# Patient Record
Sex: FEMALE | URBAN - METROPOLITAN AREA
[De-identification: names, ages, dates, MRNs, and addresses within clinical notes are randomized per-mention and may not be internally consistent; named-entity substitution may affect disease eponyms.]

---

## 2018-03-14 ENCOUNTER — APPOINTMENT (OUTPATIENT)
Age: 28
Setting detail: DERMATOLOGY
End: 2018-03-14

## 2018-03-14 DIAGNOSIS — L98429 CHRONIC ULCER OF OTHER SPECIFIED SITES: ICD-10-CM

## 2018-03-14 DIAGNOSIS — L92.1 NECROBIOSIS LIPOIDICA, NOT ELSEWHERE CLASSIFIED: ICD-10-CM

## 2018-03-14 DIAGNOSIS — L98419 CHRONIC ULCER OF OTHER SPECIFIED SITES: ICD-10-CM

## 2018-03-14 PROBLEM — L97.829 NON-PRESSURE CHRONIC ULCER OF OTHER PART OF LEFT LOWER LEG WITH UNSPECIFIED SEVERITY: Status: ACTIVE | Noted: 2018-03-14

## 2018-03-14 PROCEDURE — OTHER TREATMENT REGIMEN: OTHER

## 2018-03-14 PROCEDURE — OTHER PRESCRIPTION: OTHER

## 2018-03-14 PROCEDURE — OTHER ADDITIONAL NOTES: OTHER

## 2018-03-14 PROCEDURE — OTHER COUNSELING: OTHER

## 2018-03-14 PROCEDURE — 99214 OFFICE O/P EST MOD 30 MIN: CPT

## 2018-03-14 RX ORDER — BECAPLERMIN 100 UG/G
GEL TOPICAL
Qty: 2 | Refills: 2 | Status: ERX | COMMUNITY
Start: 2018-03-14

## 2018-03-14 ASSESSMENT — LOCATION SIMPLE DESCRIPTION DERM: LOCATION SIMPLE: LEFT PRETIBIAL REGION

## 2018-03-14 ASSESSMENT — LOCATION DETAILED DESCRIPTION DERM: LOCATION DETAILED: LEFT DISTAL PRETIBIAL REGION

## 2018-03-14 ASSESSMENT — LOCATION ZONE DERM: LOCATION ZONE: LEG

## 2018-03-14 ASSESSMENT — AREA OF WOUND IN CM2: TOTAL AREA OF WOUND IN CM2: 0

## 2018-03-14 NOTE — PROCEDURE: TREATMENT REGIMEN
Detail Level: Zone
Initiate Treatment: PACE technique- 10% benzoyl peroxide soaked gauze to inner wound, surround outside with zinc oxide, cover with Saran Wrap, then cover entire wound with tube gauze. Change once daily

## 2018-03-14 NOTE — PROCEDURE: COUNSELING
Detail Level: Detailed
Patient Specific Counseling (Will Not Stick From Patient To Patient): Over 30 minutes spent counseling on modifiable risk factors including smoking and uncontrolled diabetes. Discussed that this will likely not heal if these are not controlled. She verbalized understanding. Discussed following up with primary care to discuss smoking cessation options
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
## 2018-03-23 ENCOUNTER — RX ONLY (RX ONLY)
Age: 28
End: 2018-03-23

## 2018-03-23 RX ORDER — BENZOYL PEROXIDE 100 MG/ML
LIQUID TOPICAL
Qty: 1 | Refills: 2 | Status: ERX | COMMUNITY
Start: 2018-03-23

## 2018-03-23 RX ORDER — MAG HYDROX/ALUMINUM HYD/SIMETH 400-400-40
SUSPENSION, ORAL (FINAL DOSE FORM) ORAL
Qty: 210 | Refills: 2 | Status: ERX | COMMUNITY
Start: 2018-03-23

## 2018-04-11 ENCOUNTER — APPOINTMENT (OUTPATIENT)
Age: 28
Setting detail: DERMATOLOGY
End: 2018-04-12

## 2018-04-11 DIAGNOSIS — L97 NON-PRESSURE CHRONIC ULCER OF LOWER LIMB, NOT ELSEWHERE CLASSIFIED: ICD-10-CM

## 2018-04-11 PROBLEM — L98.499 NON-PRESSURE CHRONIC ULCER OF SKIN OF OTHER SITES WITH UNSPECIFIED SEVERITY: Status: ACTIVE | Noted: 2018-04-11

## 2018-04-11 PROCEDURE — OTHER PATIENT SPECIFIC COUNSELING: OTHER

## 2018-04-11 PROCEDURE — OTHER TREATMENT REGIMEN: OTHER

## 2018-04-11 PROCEDURE — 99213 OFFICE O/P EST LOW 20 MIN: CPT

## 2018-04-11 NOTE — PROCEDURE: TREATMENT REGIMEN
Plan: Wound care appt made for [facility] on April 18th at 2 PM Plan: Wound care appt made for [facility] on April 18th at 2 PM

## 2018-04-11 NOTE — PROCEDURE: PATIENT SPECIFIC COUNSELING
We discussed in depth on what treatment plan is best moving forward. After discussing with Dr. Barros, he agrees this is more consistent with diabetic ulcer or NLD as improved with wound care and not immunosuppressive therapy. We can biopsy to differentiate between these and PG. Patient declined biopsy. We agreed that since it improved with wound care treatments and worsened when returning to work, it would be best to restart wound care. She states she would desire referral to [facility]. Stated that this would have to be done by her primary care, patient agreeable to call to have this done. \\n\\nAlso provided name of additional dermatologist that sees her insurance if she would like another second opinion- Dr. Daly. As she has been evaluated by Dr. Barros and me, we feel that wound care or further specialist work up is warranted as she is unhappy with current recommendations. We discussed in depth on what treatment plan is best moving forward. After discussing with Dr. Barros, he agrees this is more consistent with diabetic ulcer or NLD as improved with wound care and not immunosuppressive therapy. We can biopsy to differentiate between these and PG. Patient declined biopsy. We agreed that since it improved with wound care treatments and worsened when returning to work, it would be best to restart wound care. She states she would desire referral to [facility]. Stated that this would have to be done by her primary care, patient agreeable to call to have this done. \\n\\nAlso provided name of additional dermatologist that sees her insurance if she would like another second opinion- Dr. Daly. As she has been evaluated by Dr. Barros and me, we feel that wound care or further specialist work up is warranted as she is unhappy with current recommendations.

## 2019-07-10 ENCOUNTER — APPOINTMENT (OUTPATIENT)
Age: 29
Setting detail: DERMATOLOGY
End: 2019-07-10

## 2019-07-10 ENCOUNTER — APPOINTMENT (OUTPATIENT)
Age: 29
Setting detail: DERMATOLOGY
End: 2019-07-15

## 2019-07-10 DIAGNOSIS — E10.65 TYPE 1 DIABETES MELLITUS WITH HYPERGLYCEMIA: ICD-10-CM

## 2019-07-10 DIAGNOSIS — L97 NON-PRESSURE CHRONIC ULCER OF LOWER LIMB, NOT ELSEWHERE CLASSIFIED: ICD-10-CM

## 2019-07-10 PROBLEM — L98.499 NON-PRESSURE CHRONIC ULCER OF SKIN OF OTHER SITES WITH UNSPECIFIED SEVERITY: Status: ACTIVE | Noted: 2019-07-10

## 2019-07-10 PROBLEM — E72.12 METHYLENETETRAHYDROFOLATE REDUCTASE DEFICIENCY: Status: ACTIVE | Noted: 2019-07-10

## 2019-07-10 PROCEDURE — OTHER ORDER TESTS: OTHER

## 2019-07-10 PROCEDURE — OTHER RECOMMENDATIONS: OTHER

## 2019-07-10 PROCEDURE — OTHER COUNSELING: OTHER

## 2019-07-10 PROCEDURE — 99213 OFFICE O/P EST LOW 20 MIN: CPT

## 2019-07-10 PROCEDURE — OTHER PRESCRIPTION: OTHER

## 2019-07-10 PROCEDURE — OTHER OTHER: OTHER

## 2019-07-10 PROCEDURE — OTHER ADDITIONAL NOTES: OTHER

## 2019-07-10 RX ORDER — TRIAMCINOLONE ACETONIDE 0.25 MG/G
OINTMENT TOPICAL
Qty: 1 | Refills: 0 | Status: ERX | COMMUNITY
Start: 2019-07-10

## 2019-07-10 NOTE — HPI: SECONDARY COMPLAINT
Additional History: Psychosocial: Pt states she has panic attacks driving in [place], so it is difficult to find someone to drive her to appointments. She has a special needs child and has trouble finding  for when she has appointments. This has been an obstacle with continued follow up appointments and the fact that she did not feel like her wound was improving with treatment. Pt denies any GI symptoms. Pt is quite convinced that she does indeed have a Pyoderma Gangrenosum ulcer.     LUCILA Fonseca PA-C Additional History: Psychosocial: Pt states she has panic attacks driving in [place], so it is difficult to find someone to drive her to appointments. She has a special needs child and has trouble finding  for when she has appointments. This has been an obstacle with continued follow up appointments and the fact that she did not feel like her wound was improving with treatment. Pt denies any GI symptoms. Pt is quite convinced that she does indeed have a Pyoderma Gangrenosum ulcer.     LUCILA Fonseca PA-C

## 2019-07-10 NOTE — PROCEDURE: ADDITIONAL NOTES
Additional Notes: Patient declines biopsy as she states that this will only make it worse and it will come back inconclusive. Seeking treatment with Humira or other biologic. Stated that we need to culture and biopsy to rule out other causes before treating with high risk medications. Patient stated that she will be calling office if biopsy makes area worse and that she knows that it will make it worse. Triamcinolone ointment sent for now to use along with current dakins solution. States she would like to see Diana Fonseca who she was told she was seeing today. Placed on her schedule for 1 Pm. No charge for this visit
Detail Level: Simple

## 2019-07-10 NOTE — PROCEDURE: RECOMMENDATIONS
Recommendations (Free Text): Referral to Wound center. Nurse scheduled appointment with [facility] for 7/16/19. Pt is aware. Recommendations (Free Text): Referral to Wound center. Nurse scheduled appointment with [facility] for 7/16/19. Pt is aware.

## 2019-07-10 NOTE — PROCEDURE: OTHER
Note Text (......Xxx Chief Complaint.): This diagnosis correlates with the
Detail Level: Zone
Other (Free Text): Needs vascular surgeon consult due to her history of DVT's, PE and Diabetic.

## 2019-07-15 ENCOUNTER — RX ONLY (RX ONLY)
Age: 29
End: 2019-07-15

## 2019-07-15 RX ORDER — SULFAMETHOXAZOLE AND TRIMETHOPRIM 800; 160 MG/1; MG/1
TABLET ORAL
Qty: 20 | Refills: 0 | Status: ERX | COMMUNITY
Start: 2019-07-15